# Patient Record
Sex: FEMALE | Race: WHITE | HISPANIC OR LATINO | Employment: FULL TIME | URBAN - METROPOLITAN AREA
[De-identification: names, ages, dates, MRNs, and addresses within clinical notes are randomized per-mention and may not be internally consistent; named-entity substitution may affect disease eponyms.]

---

## 2017-03-01 ENCOUNTER — HOSPITAL ENCOUNTER (EMERGENCY)
Facility: HOSPITAL | Age: 57
Discharge: HOME | End: 2017-03-01
Attending: EMERGENCY MEDICINE
Payer: COMMERCIAL

## 2017-03-01 VITALS
WEIGHT: 140 LBS | RESPIRATION RATE: 18 BRPM | HEIGHT: 65 IN | HEART RATE: 63 BPM | DIASTOLIC BLOOD PRESSURE: 79 MMHG | SYSTOLIC BLOOD PRESSURE: 140 MMHG | BODY MASS INDEX: 23.32 KG/M2 | OXYGEN SATURATION: 99 % | TEMPERATURE: 99 F

## 2017-03-01 DIAGNOSIS — R55 SYNCOPE: ICD-10-CM

## 2017-03-01 LAB
ALBUMIN SERPL BCP-MCNC: 3.9 G/DL
ALP SERPL-CCNC: 103 U/L
ALT SERPL W/O P-5'-P-CCNC: 19 U/L
ANION GAP SERPL CALC-SCNC: 10 MMOL/L
AST SERPL-CCNC: 16 U/L
BASOPHILS # BLD AUTO: 0.01 K/UL
BASOPHILS NFR BLD: 0.1 %
BILIRUB SERPL-MCNC: 0.5 MG/DL
BNP SERPL-MCNC: 63 PG/ML
BUN SERPL-MCNC: 16 MG/DL
CALCIUM SERPL-MCNC: 9.2 MG/DL
CHLORIDE SERPL-SCNC: 110 MMOL/L
CO2 SERPL-SCNC: 25 MMOL/L
CREAT SERPL-MCNC: 0.7 MG/DL
DIFFERENTIAL METHOD: ABNORMAL
EOSINOPHIL # BLD AUTO: 0 K/UL
EOSINOPHIL NFR BLD: 0.1 %
ERYTHROCYTE [DISTWIDTH] IN BLOOD BY AUTOMATED COUNT: 12.5 %
EST. GFR  (AFRICAN AMERICAN): >60 ML/MIN/1.73 M^2
EST. GFR  (NON AFRICAN AMERICAN): >60 ML/MIN/1.73 M^2
GLUCOSE SERPL-MCNC: 90 MG/DL
HCT VFR BLD AUTO: 40.5 %
HGB BLD-MCNC: 13.5 G/DL
INR PPP: 1.1
LYMPHOCYTES # BLD AUTO: 1.2 K/UL
LYMPHOCYTES NFR BLD: 16.7 %
MCH RBC QN AUTO: 31.3 PG
MCHC RBC AUTO-ENTMCNC: 33.3 %
MCV RBC AUTO: 94 FL
MONOCYTES # BLD AUTO: 0.5 K/UL
MONOCYTES NFR BLD: 6.1 %
NEUTROPHILS # BLD AUTO: 5.7 K/UL
NEUTROPHILS NFR BLD: 77 %
PLATELET # BLD AUTO: 231 K/UL
PMV BLD AUTO: 9.6 FL
POTASSIUM SERPL-SCNC: 3.5 MMOL/L
PROT SERPL-MCNC: 7 G/DL
PROTHROMBIN TIME: 11.2 SEC
RBC # BLD AUTO: 4.32 M/UL
SODIUM SERPL-SCNC: 145 MMOL/L
TROPONIN I SERPL DL<=0.01 NG/ML-MCNC: 0.01 NG/ML
WBC # BLD AUTO: 7.43 K/UL

## 2017-03-01 PROCEDURE — 80053 COMPREHEN METABOLIC PANEL: CPT

## 2017-03-01 PROCEDURE — 93005 ELECTROCARDIOGRAM TRACING: CPT

## 2017-03-01 PROCEDURE — 93010 ELECTROCARDIOGRAM REPORT: CPT | Mod: ,,, | Performed by: INTERNAL MEDICINE

## 2017-03-01 PROCEDURE — 85025 COMPLETE CBC W/AUTO DIFF WBC: CPT

## 2017-03-01 PROCEDURE — 85610 PROTHROMBIN TIME: CPT

## 2017-03-01 PROCEDURE — 83880 ASSAY OF NATRIURETIC PEPTIDE: CPT

## 2017-03-01 PROCEDURE — 99285 EMERGENCY DEPT VISIT HI MDM: CPT | Mod: 25

## 2017-03-01 PROCEDURE — 25000003 PHARM REV CODE 250: Performed by: EMERGENCY MEDICINE

## 2017-03-01 PROCEDURE — 84484 ASSAY OF TROPONIN QUANT: CPT

## 2017-03-01 RX ORDER — ENALAPRIL MALEATE 10 MG/1
10 TABLET ORAL DAILY
COMMUNITY
End: 2017-03-02 | Stop reason: SDUPTHER

## 2017-03-01 RX ORDER — ASPIRIN 325 MG
325 TABLET ORAL
Status: COMPLETED | OUTPATIENT
Start: 2017-03-01 | End: 2017-03-01

## 2017-03-01 RX ADMIN — ASPIRIN 325 MG ORAL TABLET 325 MG: 325 PILL ORAL at 01:03

## 2017-03-01 NOTE — ED PROVIDER NOTES
SCRIBE #1 NOTE: I, Christine Warren, am scribing for, and in the presence of, Nino Bullock Jr., MD. I have scribed the entire note.      History      Chief Complaint   Patient presents with    Loss of Consciousness     while at work. C/o left arm pain PTA but denies CP or SOB. Pt appears dazed but oriented x 3.        Review of patient's allergies indicates:   Allergen Reactions    Penicillins         HPI   HPI    3/1/2017, 12:50 PM   History obtained from the patient      History of Present Illness: Marta Flowers is a 56 y.o. female patient who presents to the Emergency Department for syncope which onset suddenly PTA while pt was at work. Episode lasted approximately 1 minute. Sx have been episodic and moderate in severity. No modifying factors noted. Pt reports experiencing a discomfort to the periumbilical region prior to losing consciousness. Pt is currently complaining of left posterior shoulder pain. Pt denies any fever, chills, CP, SOB, n/v/d, HA, dizziness, speech difficulty, or unilateral weakness/numbness. No further complaints at this time.       Arrival mode: Ambulance Service.     PCP: Primary Doctor No     Past Medical History:  Past medical history reviewed not relevant      Past Surgical History:  Past surgical history reviewed not relevant      Family History:  Family history reviewed not relevant      Social History:  Social History Main Topics    Smoking status: Unknown if ever smoked    Smokeless tobacco: Unknown if ever used    Alcohol Use: Unknown drinking history    Drug Use: Unknown if ever used    Sexual Activity: Unknown     ROS   Review of Systems   Constitutional: Negative for chills, diaphoresis and fever.   HENT: Negative for sore throat.    Eyes: Negative for visual disturbance.   Respiratory: Negative for shortness of breath.    Cardiovascular: Negative for chest pain.   Gastrointestinal: Positive for abdominal pain. Negative for constipation, diarrhea, nausea and vomiting.  "  Genitourinary: Negative for dysuria.   Musculoskeletal: Positive for arthralgias (left shoulder). Negative for joint swelling, neck pain and neck stiffness.   Skin: Negative for rash.   Neurological: Positive for syncope. Negative for dizziness, speech difficulty, weakness, light-headedness, numbness and headaches.   Hematological: Does not bruise/bleed easily.       Physical Exam    Initial Vitals   BP Pulse Resp Temp SpO2   03/01/17 1243 03/01/17 1243 03/01/17 1243 03/01/17 1243 03/01/17 1243   125/84 76 18 98.5 °F (36.9 °C) 98 %      Physical Exam  Nursing Notes and Vital Signs Reviewed.  Constitutional: Patient is in no acute distress. Awake and alert. Well-developed and well-nourished.  Head: Atraumatic. Normocephalic.  Eyes: PERRL. EOM intact. Conjunctivae are not pale. No scleral icterus.  ENT: Mucous membranes are moist. Oropharynx is clear and symmetric.    Neck: Supple. Full ROM. No lymphadenopathy.  Cardiovascular: Regular rate. Regular rhythm. No murmurs, rubs, or gallops. Distal pulses are 2+ and symmetri.  Pulmonary/Chest: No respiratory distress. Clear to auscultation bilaterally. No wheezing, rales, or rhonchi.  Abdominal: Soft and non-distended.  There is no tenderness.  No rebound, guarding, or rigidity.   Musculoskeletal: Moves all extremities. No obvious deformities. No edema. No calf tenderness.  Skin: Warm and dry.  Neurological:  Alert, awake, and appropriate.  Normal speech.  No acute focal neurological deficits are appreciated.  Psychiatric: Normal affect. Good eye contact. Appropriate in content.    ED Course    Procedures  ED Vital Signs:  Vitals:    03/01/17 1243 03/01/17 1254 03/01/17 1309 03/01/17 1311   BP: 125/84  125/77 128/73   Pulse: 76 72 69 70   Resp: 18  18 18   Temp: 98.5 °F (36.9 °C)      TempSrc: Oral      SpO2: 98%  100%    Weight: 63.5 kg (140 lb)      Height: 5' 5" (1.651 m)       03/01/17 1313 03/01/17 1402   BP: 130/83 (!) 140/79   Pulse: 77 63   Resp: 20 18   Temp:   "   TempSrc:     SpO2: 99% 99%   Weight:     Height:         Abnormal Lab Results:  Labs Reviewed   CBC W/ AUTO DIFFERENTIAL - Abnormal; Notable for the following:        Result Value    MCH 31.3 (*)     Gran% 77.0 (*)     Lymph% 16.7 (*)     All other components within normal limits    Narrative:     PLEASE REVIEW ORDER START TIME BEFORE MARKING SPECIMEN  COLLECTED.   COMPREHENSIVE METABOLIC PANEL    Narrative:     PLEASE REVIEW ORDER START TIME BEFORE MARKING SPECIMEN  COLLECTED.   PROTIME-INR    Narrative:     PLEASE REVIEW ORDER START TIME BEFORE MARKING SPECIMEN  COLLECTED.   TROPONIN I    Narrative:     PLEASE REVIEW ORDER START TIME BEFORE MARKING SPECIMEN  COLLECTED.   B-TYPE NATRIURETIC PEPTIDE    Narrative:     PLEASE REVIEW ORDER START TIME BEFORE MARKING SPECIMEN  COLLECTED.        All Lab Results:  Results for orders placed or performed during the hospital encounter of 03/01/17   CBC auto differential   Result Value Ref Range    WBC 7.43 3.90 - 12.70 K/uL    RBC 4.32 4.00 - 5.40 M/uL    Hemoglobin 13.5 12.0 - 16.0 g/dL    Hematocrit 40.5 37.0 - 48.5 %    MCV 94 82 - 98 fL    MCH 31.3 (H) 27.0 - 31.0 pg    MCHC 33.3 32.0 - 36.0 %    RDW 12.5 11.5 - 14.5 %    Platelets 231 150 - 350 K/uL    MPV 9.6 9.2 - 12.9 fL    Gran # 5.7 1.8 - 7.7 K/uL    Lymph # 1.2 1.0 - 4.8 K/uL    Mono # 0.5 0.3 - 1.0 K/uL    Eos # 0.0 0.0 - 0.5 K/uL    Baso # 0.01 0.00 - 0.20 K/uL    Gran% 77.0 (H) 38.0 - 73.0 %    Lymph% 16.7 (L) 18.0 - 48.0 %    Mono% 6.1 4.0 - 15.0 %    Eosinophil% 0.1 0.0 - 8.0 %    Basophil% 0.1 0.0 - 1.9 %    Differential Method Automated    Comprehensive metabolic panel   Result Value Ref Range    Sodium 145 136 - 145 mmol/L    Potassium 3.5 3.5 - 5.1 mmol/L    Chloride 110 95 - 110 mmol/L    CO2 25 23 - 29 mmol/L    Glucose 90 70 - 110 mg/dL    BUN, Bld 16 6 - 20 mg/dL    Creatinine 0.7 0.5 - 1.4 mg/dL    Calcium 9.2 8.7 - 10.5 mg/dL    Total Protein 7.0 6.0 - 8.4 g/dL    Albumin 3.9 3.5 - 5.2 g/dL     Total Bilirubin 0.5 0.1 - 1.0 mg/dL    Alkaline Phosphatase 103 55 - 135 U/L    AST 16 10 - 40 U/L    ALT 19 10 - 44 U/L    Anion Gap 10 8 - 16 mmol/L    eGFR if African American >60 >60 mL/min/1.73 m^2    eGFR if non African American >60 >60 mL/min/1.73 m^2   Protime-INR   Result Value Ref Range    Prothrombin Time 11.2 9.0 - 12.5 sec    INR 1.1 0.8 - 1.2   Troponin I   Result Value Ref Range    Troponin I 0.014 0.000 - 0.026 ng/mL   B-Type natriuretic peptide (BNP)   Result Value Ref Range    BNP 63 0 - 99 pg/mL         Imaging Results:  Imaging Results         CT Head Without Contrast (Final result) Result time:  03/01/17 14:03:39    Final result by Claudia Barrios III, MD (03/01/17 14:03:39)    Impression:       No acute intracranial disease identified.       Electronically signed by: CLAUDIA BARRIOS MD  Date:     03/01/17  Time:    14:03     Narrative:    Head CT without contrast    Clinical history: R55 Syncope and collapse    TECHNIQUE: Routine noncontrast axial head CT. All CT scans at this facility use dose modulation, iterative reconstruction, and/or weight based dosing when appropriate to reduce radiation dose to as low as reasonably achievable.    Comparison: none    Findings: There is no evidence of intracranial hemorrhage, mass-effect, hydrocephalus or other acute disease. There is no CT evidence of acute ischemia or cerebral edema. The visualized paranasal sinuses and mastoid air cells are clear. No calvarial fracture is identified.            X-Ray Chest PA And Lateral (Final result) Result time:  03/01/17 13:33:10    Final result by Claudia Barrios III, MD (03/01/17 13:33:10)    Impression:     No acute disease identified in the chest.      Electronically signed by: CLAUDIA BARRIOS MD  Date:     03/01/17  Time:    13:33     Narrative:    XR CHEST PA AND LATERAL    Clinical history: Chest Pain    Findings: The lungs appear clear of active disease. There is no evidence of pneumonia, mass, effusion,  pneumothorax or other acute pulmonary disease.  Cardiomediastinal silhouette is within normal limits.  No acute osseous abnormality detected.             The EKG was ordered, reviewed, and independently interpreted by the ED provider.  Interpretation time: 12:48  Rate: 62 BPM  Rhythm: normal sinus rhythm  Interpretation: LAD. No STEMI.         The Emergency Provider reviewed the vital signs and test results, which are outlined above.    ED Discussion     2:25 PM: Reassessed pt at this time.   Discussed with pt all pertinent ED information and results. Discussed pt dx  and plan of tx. Gave pt all f/u and return to the ED instructions. All questions and concerns were addressed at this time. Pt expresses understanding of information and instructions, and is comfortable with plan to discharge. Pt is stable for discharge.    ED Medication(s):  Medications   aspirin tablet 325 mg (325 mg Oral Given 3/1/17 0473)       Follow-up Information     Follow up with PCP. Call in 2 days.            Medical Decision Making    Medical Decision Making:   Clinical Tests:   Lab Tests: Ordered and Reviewed  Radiological Study: Ordered and Reviewed  Medical Tests: Ordered and Reviewed           Scribe Attestation:   Scribe #1: I performed the above scribed service and the documentation accurately describes the services I performed. I attest to the accuracy of the note.    Attending:   Physician Attestation Statement for Scribe #1: I, Nino Bullock Jr., MD, personally performed the services described in this documentation, as scribed by Christine Warren, in my presence, and it is both accurate and complete.          Clinical Impression       ICD-10-CM ICD-9-CM   1. Syncope R55 780.2       Disposition:   Disposition: Discharged  Condition: Stable         Nino Bullock Jr., MD  03/01/17 5181

## 2017-03-01 NOTE — DISCHARGE INSTRUCTIONS
Causes of Syncope  Syncope (fainting) has many causes. Sometimes it is not serious. In other cases, syncope is a sign of a heart problem. But treatment can help    When syncope is not serious  Your healthcare provider may call your problem vasovagal syncope, reflex syncope, or orthostatic hypotension. These types of syncope are generally not serious. They can be caused by:  · Strong feelings, such as anxiety or fear. A nerve signal may briefly change your heart rate and lower your blood pressure too much.  · Standing for too long. Standing may cause blood to pool in your legs. When this happens, your brain may not receive all the blood it needs.  · Standing up too quickly. Your blood pressure may not adjust fast enough to changes in posture and may drop too low. Certain medicines can also cause this problem. Examples of medicines that can cause a drop in blood pressure include diuretics, blood pressure medicines, and medicines for chest pain. Your pharmacist or healthcare provider can discuss these with you.  · Reaction to normal body functions. When you go to the bathroom, have gastrointestinal discomfort, nausea, or pain, your heart may have a natural reflex to slow down and lower blood pressure. This can result in syncope. This may also follow exercise, eating, laughter, weight lifting, or playing musical instruments like the trumpet or trombone.  When heart trouble causes syncope  A heart problem can decrease the amount of oxygen-rich blood that reaches the brain. Heart trouble can be serious and even life threatening if not treated:  · A slow heart rate. Electrical signals tell the chambers of the heart when to pump. But the signals may be slowed or blocked (heart block) as they travel on the hearts electrical pathways. This can be caused by aging, scarred heart tissue, or damage from heart disease. When the heart rate slows, not enough blood is pumped.  · A fast heart rate. Certain problems can make the  heart race. For instance, after a heart attack, also known as acute myocardial infarction, or AMI, abnormal electrical signals may be created. These signals can make the heart suddenly beat very fast. The heart pumps before the chambers can fill with blood. So less blood reaches the brain and other parts of the body. Illegal drugs, certain medicines, heart disease, or an inherited condition can also cause this.  · A heart valve problem. Blood travels through the chambers of the heart as it is pumped. Heart valves open and close to help move blood in the right direction. But a valve may not open or close fully, if its hardened or scarred. As a result, less blood is pumped through the heart to the brain and body. Most often, syncope occurs when a person's aortic valve is critically narrowed and he or she participates in  a strenuous activity.  · A heart muscle problem. Some people develop a thickened heart muscle that blocks blood flow out of the heart to the body. This is called hypertrophic cardiomyopathy. Being dehydrated and having hypertrophic cardiomyopathy can increase the risk for syncope.  Whatever the cause of syncope, it is important to be evaluated by your healthcare provider. You may need to be seen by a cardiologist, neurologist, or an ear, nose, and throat specialist. Do not drive, operate heavy machinery, or participate in activities in which you would be at risk for falls and injury if you have syncope and have not been evaluated.  Date Last Reviewed: 5/1/2016  © 5118-3280 [employer]. [address] [place] [address], [place] [address]. All rights reserved. This information is not intended as a substitute for professional medical care. Always follow your healthcare professional's instructions.

## 2017-03-02 ENCOUNTER — OFFICE VISIT (OUTPATIENT)
Dept: INTERNAL MEDICINE | Facility: CLINIC | Age: 57
End: 2017-03-02
Payer: COMMERCIAL

## 2017-03-02 ENCOUNTER — TELEPHONE (OUTPATIENT)
Dept: INTERNAL MEDICINE | Facility: CLINIC | Age: 57
End: 2017-03-02

## 2017-03-02 ENCOUNTER — PATIENT OUTREACH (OUTPATIENT)
Dept: ADMINISTRATIVE | Facility: HOSPITAL | Age: 57
End: 2017-03-02
Payer: COMMERCIAL

## 2017-03-02 VITALS
TEMPERATURE: 98 F | OXYGEN SATURATION: 98 % | SYSTOLIC BLOOD PRESSURE: 138 MMHG | HEIGHT: 65 IN | DIASTOLIC BLOOD PRESSURE: 85 MMHG | BODY MASS INDEX: 21.79 KG/M2 | WEIGHT: 130.81 LBS | HEART RATE: 63 BPM

## 2017-03-02 DIAGNOSIS — Z80.0 FH: GASTRIC CANCER: ICD-10-CM

## 2017-03-02 DIAGNOSIS — R55 SYNCOPE, UNSPECIFIED SYNCOPE TYPE: Primary | ICD-10-CM

## 2017-03-02 DIAGNOSIS — K59.09 CONSTIPATION, CHRONIC: ICD-10-CM

## 2017-03-02 DIAGNOSIS — I10 ESSENTIAL HYPERTENSION: ICD-10-CM

## 2017-03-02 PROCEDURE — 99999 PR PBB SHADOW E&M-EST. PATIENT-LVL III: CPT | Mod: PBBFAC,,, | Performed by: FAMILY MEDICINE

## 2017-03-02 PROCEDURE — 1160F RVW MEDS BY RX/DR IN RCRD: CPT | Mod: S$GLB,,, | Performed by: FAMILY MEDICINE

## 2017-03-02 PROCEDURE — 3075F SYST BP GE 130 - 139MM HG: CPT | Mod: S$GLB,,, | Performed by: FAMILY MEDICINE

## 2017-03-02 PROCEDURE — 3079F DIAST BP 80-89 MM HG: CPT | Mod: S$GLB,,, | Performed by: FAMILY MEDICINE

## 2017-03-02 PROCEDURE — 99203 OFFICE O/P NEW LOW 30 MIN: CPT | Mod: S$GLB,,, | Performed by: FAMILY MEDICINE

## 2017-03-02 RX ORDER — ENALAPRIL MALEATE 10 MG/1
10 TABLET ORAL DAILY
Qty: 90 TABLET | Refills: 3 | Status: SHIPPED | OUTPATIENT
Start: 2017-03-02 | End: 2018-03-10 | Stop reason: SDUPTHER

## 2017-03-02 NOTE — PROGRESS NOTES
Subjective:       Patient ID: Marta Flowers is a 56 y.o. female.    Chief Complaint: Establish Care; blood pressure medication; and hospital follow up for blood pressure    HPI Comments: Emergency room follow-up for syncope.  She reports that for the past 14 days she working 12  hours a day.  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  She had an episode at work where she stood up felt lightheaded and she reports blacked out.  She denies  chest pain palpitations or shortness of breath.  Denies any leg swelling or pain.  She reports some epigastric discomfort at the time.  She reports this is the fourth episode over the last 5 years.  She had 1 episode 5 years ago and another  episode 1 year ago.  Family history includes a sister with gastric cancer recently .  Emergency room evaluation was essentially negative with a CBC CMP and EKG CT of head been done.  She also has a concern of chronic constipation over several years with occasional left abdominal pain.  She denies any nausea vomiting or rectal bleeding or melena.  She reports about 12 pounds weight loss in the past 2 weeks that she attributes to increased workload.  She reports her appetite is normal and she is eating well.  Medication includes lisinopril 10 mg a day.      Review of Systems   Constitutional: Positive for unexpected weight change. Negative for appetite change, fatigue and fever.   HENT: Negative for congestion.    Respiratory: Negative for shortness of breath and wheezing.    Cardiovascular: Negative for chest pain, palpitations and leg swelling.   Gastrointestinal: Positive for abdominal pain and constipation. Negative for diarrhea, nausea and vomiting.   Genitourinary: Negative for difficulty urinating and frequency.   Neurological: Positive for syncope. Negative for seizures, numbness and headaches.       Objective:      Physical Exam   Constitutional: She is oriented to person, place, and time. She appears  well-developed and well-nourished.   Eyes: Conjunctivae are normal.   Neck: Neck supple. No thyromegaly present.   Cardiovascular: Normal rate, regular rhythm and normal heart sounds.    No murmur heard.  2+ carotid pulse   Pulmonary/Chest: Effort normal and breath sounds normal. No respiratory distress. She has no wheezes. She has no rales. She exhibits tenderness.   Tenderness without deformity.localized to a lower ant rib left side   Abdominal: Soft. Bowel sounds are normal. She exhibits no mass. There is no tenderness.   Lymphadenopathy:     She has no cervical adenopathy.   Neurological: She is alert and oriented to person, place, and time. No cranial nerve deficit. She exhibits normal muscle tone. Coordination normal.   Skin: No rash noted.   Psychiatric: She has a normal mood and affect. Her behavior is normal. Thought content normal.       Assessment:       1. Syncope, unspecified syncope type        Plan:     blood pressure noticed with no postural change.  Echocardiogram and holter  monitor ordered.  Follow-up in 2 weeks.  She's having some dental pain and to see a dentist  today.  She eventually needs GI referral for chronic constipation and abdominal pain.  Lipid profile TSH to be done next visit   Lisinopril refilled

## 2017-03-02 NOTE — MR AVS SNAPSHOT
[facility]  [address]  [place] [address]  Phone: [phone]  Fax: [phone]                  Marta Flowers   3/2/2017 10:30 AM   Office Visit    Description:  Female : 1960   Provider:  Virgilio James MD   Department:  [facility]           Reason for Visit     Establish Care     blood pressure medication     hospital follow up for blood pressure           Diagnoses this Visit        Comments    Syncope, unspecified syncope type    -  Primary     Essential hypertension         Constipation, chronic         FH: gastric cancer                To Do List           Future Appointments        Provider Department Dept Phone    3/16/2017 7:00 AM Virgilio James MD [facility] [phone]      Goals (5 Years of Data)     None      Follow-Up and Disposition     Return in about 2 weeks (around 3/16/2017).    Follow-up and Disposition History       These Medications        Disp Refills Start End    enalapril (VASOTEC) 10 MG tablet 90 tablet 3 3/2/2017     Take 1 tablet (10 mg total) by mouth once daily. - Oral    Pharmacy: [facility] [address] [place] [address] #: [phone]         [facility] On Call     [facility] On Call Nurse Care Line -  Assistance  Registered nurses in the [facility] On Call Center provide clinical advisement, health education, appointment booking, and other advisory services.  Call for this free service at [phone].             Medications           Message regarding Medications     Verify the changes and/or additions to your medication regime listed below are the same as discussed with your clinician today.  If any of these changes or additions are incorrect, please notify your healthcare provider.        CHANGE how you are taking these medications     Start Taking Instead of    enalapril (VASOTEC) 10 MG tablet enalapril (VASOTEC) 10 MG tablet    Dosage:  Take 1 tablet (10 mg total) by mouth once  daily. Dosage:  Take 10 mg by mouth once daily.    Reason for Change:  Reorder            Verify that the below list of medications is an accurate representation of the medications you are currently taking.  If none reported, the list may be blank. If incorrect, please contact your healthcare provider. Carry this list with you in case of emergency.           Current Medications     enalapril (VASOTEC) 10 MG tablet Take 1 tablet (10 mg total) by mouth once daily.           Clinical Reference Information           Your Vitals Were     BP                   138/85 (BP Location: Right arm, Patient Position: Sitting, BP Method: Automatic)           Blood Pressure          Most Recent Value    BP  138/85      Allergies as of 3/2/2017     Penicillins      Immunizations Administered on Date of Encounter - 3/2/2017     None      Orders Placed During Today's Visit     Future Labs/Procedures Expected by Expires    2D Echo Only  As directed 3/2/2018    Holter monitor - 24 hour  As directed 3/2/2018      [facility] Sign-Up     Activating your [facility] account is as easy as 1-2-3!     1) Visit [email or web address], select Sign Up Now, enter this activation code and your date of birth, then select Next.  [email or web address]  Expires: 4/15/2017  2:26 PM      2) Create a username and password to use when you visit [facility] in the future and select a security question in case you lose your password and select Next.    3) Enter your e-mail address and click Sign Up!    Additional Information  If you have questions, please e-mail [email or web address] or call [phone] to talk to our [facility] staff. Remember, [facility] is NOT to be used for urgent needs. For medical emergencies, dial 911.         Language Assistance Services     ATTENTION: Language assistance services are available, free of charge. Please call [phone].      ATENCIÓN: Si habla español, tiene a partida disposición servicios gratuitos de asistencia lingüística. Llame al  [phone].     ADIN Ý: N?u b?n nói Ti?ng Vi?t, có các d?ch v? h? tr? ngôn ng? mi?n phí dành cho b?n. G?i s? [phone].         [facility] complies with applicable Federal civil rights laws and does not discriminate on the basis of race, color, national origin, age, disability, or sex.

## 2017-03-02 NOTE — TELEPHONE ENCOUNTER
Pt states that she will schedule her Echo and Holter Appt when she comes back for her 2wk visit on 3/16/17

## 2017-03-06 ENCOUNTER — TELEPHONE (OUTPATIENT)
Dept: CARDIOLOGY | Facility: CLINIC | Age: 57
End: 2017-03-06

## 2017-03-06 NOTE — TELEPHONE ENCOUNTER
Attempted to reach patient in order to schedule the Echo and holter that has been ordered for her.  No option to leave a voice mail.

## 2017-03-07 ENCOUNTER — NURSE TRIAGE (OUTPATIENT)
Dept: ADMINISTRATIVE | Facility: CLINIC | Age: 57
End: 2017-03-07

## 2017-03-07 ENCOUNTER — TELEPHONE (OUTPATIENT)
Dept: INTERNAL MEDICINE | Facility: CLINIC | Age: 57
End: 2017-03-07

## 2017-03-07 NOTE — TELEPHONE ENCOUNTER
Pt was calling about a medical release form- advised to follow up with PCP    Reason for Disposition   Information only question and nurse able to answer    Protocols used: ST NO PROTOCOL CALL: INFORMATION ONLY-A-[place]

## 2017-03-07 NOTE — TELEPHONE ENCOUNTER
----- Message from Jojo Hudson sent at 3/7/2017  8:51 AM CST -----  Contact: Patient   Patient request a call back at [phone], Regards to a release back to work.    Thanks  Td

## 2017-03-08 ENCOUNTER — TELEPHONE (OUTPATIENT)
Dept: INTERNAL MEDICINE | Facility: CLINIC | Age: 57
End: 2017-03-08

## 2017-03-08 NOTE — TELEPHONE ENCOUNTER
----- Message from Rhina Sexton sent at 3/7/2017  4:49 PM CST -----  Contact: Cathryn [phone]  Please fax doctor's notes on 03/01/17 to [phone] regarding patient. Please call back @ [phone]. Thank you

## 2017-03-08 NOTE — TELEPHONE ENCOUNTER
Spoke with Cathryn, informed her that we are not allowed to give them her medical records, she can come sign a release and she can give them to her employer. Verbalized understanding.

## 2017-03-15 ENCOUNTER — TELEPHONE (OUTPATIENT)
Dept: CARDIOLOGY | Facility: CLINIC | Age: 57
End: 2017-03-15

## 2017-03-15 NOTE — TELEPHONE ENCOUNTER
Attempted to contact pt regarding the echo and holter that was ordered for her.  Unsuccessful in my attempts.  I will mail letter.

## 2017-03-22 ENCOUNTER — DOCUMENTATION ONLY (OUTPATIENT)
Dept: CARDIOLOGY | Facility: CLINIC | Age: 57
End: 2017-03-22

## 2017-03-22 ENCOUNTER — TELEPHONE (OUTPATIENT)
Dept: INTERNAL MEDICINE | Facility: CLINIC | Age: 57
End: 2017-03-22

## 2017-03-22 NOTE — TELEPHONE ENCOUNTER
----- Message from Yesenia Parr sent at 3/22/2017  2:35 PM CDT -----  We have attempted to contact this patient on many occasions to schedule the echo and holter that you ordered.  We have left messages and mailed letters without hearing back from her.  Your office may have better luck getting in touch with her.

## 2017-03-22 NOTE — PROGRESS NOTES
Sent message to provider regarding our many failed attempts to contact patient for scheduling of her cardiac test.  Will defer for another week.  If she is not scheduled by then we will remove the orders from the workque.

## 2018-03-12 RX ORDER — ENALAPRIL MALEATE 10 MG/1
TABLET ORAL
Qty: 30 TABLET | Refills: 0 | Status: SHIPPED | OUTPATIENT
Start: 2018-03-12 | End: 2018-05-04 | Stop reason: SDUPTHER

## 2018-05-07 RX ORDER — ENALAPRIL MALEATE 10 MG/1
TABLET ORAL
Qty: 30 TABLET | Refills: 0 | Status: SHIPPED | OUTPATIENT
Start: 2018-05-07

## 2019-04-30 NOTE — LETTER
March 2, 2017    Marta Flowers  [address] [place] [address]             [facility]  [address]  [place] [address]  Phone: [phone] Dear Mrs. Flowers:    [facility] is committed to your overall health.  To help you get the most out of each of your visits, we will review your information to make sure you are up to date on all of your recommended tests and/or procedures.      DR. EDNA TATE has found that you may be due for   Health Maintenance Due   Topic    Hepatitis C Screening     Lipid Panel     TETANUS VACCINE     Pap Smear     Mammogram     Colonoscopy     Influenza Vaccine       If you have had any of the above done at another facility, please bring the records or information with you so that your record at [facility] will be complete.    If you are currently taking medication, please bring it with you to your appointment for review.    We will be happy to assist you with scheduling any necessary appointments or you may contact the [facility] appointment desk at [phone] to schedule at your convenience.     Thank you for choosing [facility] for your healthcare needs.  If you have any questions or concerns, please don't hesitate to call.    Sincerely,  Priscila ARREOLA LPN Care Coordinator  [facility]           
Attestation Statements

## 2020-07-25 ENCOUNTER — TELEPHONE ENCOUNTER (OUTPATIENT)
Dept: URBAN - METROPOLITAN AREA CLINIC 13 | Facility: CLINIC | Age: 60
End: 2020-07-25

## 2020-07-25 RX ORDER — POLYETHYLENE GLYCOL 3350, SODIUM SULFATE, SODIUM CHLORIDE, POTASSIUM CHLORIDE, ASCORBIC ACID, SODIUM ASCORBATE 7.5-2.691G
DRINK HALF OF THE SOLUTION AT 5 PM THE DAY BEFORE PROCEDURE AND THEN LAST HALF 6 HOURS PRIOR TO PROCEDURE KIT ORAL
Qty: 1 | Refills: 0 | OUTPATIENT
Start: 2017-01-13 | End: 2017-03-29

## 2020-07-25 RX ORDER — NAPROXEN SODIUM 220 MG
TAKE 2 TABLET AS NEEDED TABLET ORAL
Refills: 0 | OUTPATIENT
Start: 2007-11-27 | End: 2012-05-30

## 2020-07-25 RX ORDER — HYOSCYAMINE SULFATE 0.12 MG/1
TAKE 1 TABLET SL Q6 HRS PRN ABDOMINAL PAIN TABLET, ORALLY DISINTEGRATING ORAL
Qty: 60 | Refills: 2 | OUTPATIENT
Start: 2012-05-30 | End: 2017-01-13

## 2020-07-25 RX ORDER — ACETAMINOPHEN 500 MG/1
TAKE 2 TABLET AS NEEDED TABLET, FILM COATED ORAL
Refills: 0 | OUTPATIENT
Start: 2008-05-09 | End: 2012-05-30

## 2020-07-26 ENCOUNTER — TELEPHONE ENCOUNTER (OUTPATIENT)
Dept: URBAN - METROPOLITAN AREA CLINIC 13 | Facility: CLINIC | Age: 60
End: 2020-07-26

## 2020-07-26 RX ORDER — NAPROXEN SODIUM 220 MG
TAKE 1 TABLET TWICE DAILY AS NEEDED TABLET ORAL
Refills: 0 | Status: ACTIVE | COMMUNITY

## 2020-07-26 RX ORDER — ESTRADIOL 10 UG/1
TABLET VAGINAL
Refills: 0 | Status: ACTIVE | COMMUNITY

## 2020-07-26 RX ORDER — WHEAT DEXTRIN 3 G/4 G
TAKE 1 TBSP DAILY POWDER (GRAM) ORAL
Qty: 1 | Refills: 11 | Status: ACTIVE | COMMUNITY

## 2020-07-26 RX ORDER — ESOMEPRAZOLE MAGNESIUM 40 MG
TAKE ONE CAPSULE BY MOUTH TWICE A DAY 30 MIN BEFORE BREAKFAST AND DINNER CAPSULE,DELAYED RELEASE (ENTERIC COATED) ORAL
Qty: 60 | Refills: 11 | Status: ACTIVE | COMMUNITY
Start: 2017-03-29

## 2020-07-26 RX ORDER — HYOSCYAMINE SULFATE 0.12 MG/1
DISSOLVE 1 TABLET UNDER THE TONGUE EVERY 4 TO 6 HOURS AS NEEDED FOR ABDOMINAL PAIN TABLET, ORALLY DISINTEGRATING ORAL
Qty: 30 | Refills: 3 | Status: ACTIVE | COMMUNITY
Start: 2017-03-29

## 2022-02-04 ENCOUNTER — OFFICE VISIT (OUTPATIENT)
Dept: URBAN - METROPOLITAN AREA CLINIC 113 | Facility: CLINIC | Age: 62
End: 2022-02-04
Payer: COMMERCIAL

## 2022-02-04 ENCOUNTER — WEB ENCOUNTER (OUTPATIENT)
Dept: URBAN - METROPOLITAN AREA CLINIC 113 | Facility: CLINIC | Age: 62
End: 2022-02-04

## 2022-02-04 ENCOUNTER — LAB OUTSIDE AN ENCOUNTER (OUTPATIENT)
Dept: URBAN - METROPOLITAN AREA CLINIC 113 | Facility: CLINIC | Age: 62
End: 2022-02-04

## 2022-02-04 VITALS
HEART RATE: 85 BPM | HEIGHT: 68 IN | WEIGHT: 150 LBS | RESPIRATION RATE: 18 BRPM | BODY MASS INDEX: 22.73 KG/M2 | DIASTOLIC BLOOD PRESSURE: 80 MMHG | TEMPERATURE: 98 F | SYSTOLIC BLOOD PRESSURE: 117 MMHG

## 2022-02-04 DIAGNOSIS — K21.9 GERD WITHOUT ESOPHAGITIS: ICD-10-CM

## 2022-02-04 DIAGNOSIS — Z12.11 SCREEN FOR COLON CANCER: ICD-10-CM

## 2022-02-04 PROCEDURE — 99203 OFFICE O/P NEW LOW 30 MIN: CPT | Performed by: INTERNAL MEDICINE

## 2022-02-04 RX ORDER — ESOMEPRAZOLE MAGNESIUM 40 MG
TAKE ONE CAPSULE BY MOUTH TWICE A DAY 30 MIN BEFORE BREAKFAST AND DINNER CAPSULE,DELAYED RELEASE (ENTERIC COATED) ORAL
Qty: 60 | Refills: 11 | Status: DISCONTINUED | COMMUNITY
Start: 2017-03-29

## 2022-02-04 RX ORDER — CYCLOSPORINE 0.5 MG/ML
1 DROP INTO AFFECTED EYE EMULSION OPHTHALMIC TWICE A DAY
Status: ACTIVE | COMMUNITY

## 2022-02-04 RX ORDER — NAPROXEN SODIUM 220 MG
TAKE 1 TABLET TWICE DAILY AS NEEDED TABLET ORAL
Refills: 0 | Status: ACTIVE | COMMUNITY

## 2022-02-04 RX ORDER — WHEAT DEXTRIN 3 G/4 G
TAKE 1 TBSP DAILY POWDER (GRAM) ORAL
Qty: 1 | Refills: 11 | Status: DISCONTINUED | COMMUNITY

## 2022-02-04 RX ORDER — FAMOTIDINE 20 MG/1
1 TABLET AT BEDTIME AS NEEDED TABLET, FILM COATED ORAL ONCE A DAY
Status: ACTIVE | COMMUNITY

## 2022-02-04 RX ORDER — BUPROPION HYDROCHLORIDE 75 MG/1
1 TABLET TABLET ORAL TWICE A DAY
Status: ACTIVE | COMMUNITY

## 2022-02-04 RX ORDER — ALENDRONATE SODIUM 70 MG/1
1 TABLET 30 MINUTES BEFORE THE FIRST FOOD, BEVERAGE OR MEDICINE OF THE DAY WITH PLAIN WATER TABLET ORAL WEEKLY
Status: ACTIVE | COMMUNITY

## 2022-02-04 RX ORDER — ESTRADIOL 10 UG/1
1 TABLET TABLET VAGINAL
Refills: 0 | Status: DISCONTINUED | COMMUNITY

## 2022-02-04 RX ORDER — HYOSCYAMINE SULFATE 0.12 MG/1
DISSOLVE 1 TABLET UNDER THE TONGUE EVERY 4 TO 6 HOURS AS NEEDED FOR ABDOMINAL PAIN TABLET, ORALLY DISINTEGRATING ORAL
Qty: 30 | Refills: 3 | Status: ACTIVE | COMMUNITY
Start: 2017-03-29

## 2022-02-04 RX ORDER — CALCIUM CARBONATE/VITAMIN D3 600 MG-10
1 TABLET WITH A MEAL TABLET ORAL ONCE A DAY
Status: ACTIVE | COMMUNITY

## 2022-02-04 RX ORDER — POLYETHYLENE GLYCOL 3350, SODIUM SULFATE, SODIUM CHLORIDE, POTASSIUM CHLORIDE, ASCORBIC ACID, SODIUM ASCORBATE 140-9-5.2G
AS DIRECTED KIT ORAL ONCE
Qty: 30 | Refills: 1 | OUTPATIENT
Start: 2022-02-04 | End: 2022-04-05

## 2022-02-04 NOTE — HPI-TODAY'S VISIT:
Ms. Doe is a 61-year-old female with a history of GERD, abdominal pain, and constipation presenting for follow-up after a diagnostic testing and therapy for pelvic floor dysfunction. She was last seen here on 1/17/18.  She was seen 10/5/17 to evaluate for possible pelvic floor dysfunction. Symptoms were typical for pelvic floor dyssynergia evidenced by incomplete evacuation with the inability to expel a soft stool. She was scheduled for an MR defecography and anorectal manometry. IBgard was recommended and she was instructed to continue daily fiber.  MR defecography 10/18/17: Dyssynergia defecation with patient unable to expel any of the instilled ultrasound gel from the rectum. No rectocele, cystocele, enterocele, intussusception, mass, polyps, or other structural abnormality detected.  Anorectal manometry 10/18/17: Pelvic floor dyssynergia type III with adequate pushing effort and inadequate relaxation. This has been confirmed with MR defecography showing severe dyssynergia. Patient will likely benefit from pelvic floor retraining.  She was referred to Core Therapy. She reports that this was very successful in remediating her pelvic floor difficulties.  At the time of her last visit in 2018, she was drinking adequate amounts of water, taking daily fiber, and MiraLAX. She has attended multiple physical therapy sessions and is utilizing recommended exercises and breathing. She is having daily bowel movements with complete evacuation. She had modified her diet and gas has significantly improved. IBgard was not helpful and she discontinued it. She reported improvement of abdominal pain, back pain, and heartburn.   She returns todat with c/o worsening reflux symptoms.  She is actually doing "okay."  She has been off Nexium and has been taking Pepcid 40 mg daily typically in the evening about 10 or 11 PM.  Her reflux tend to be nocturnal.  She often sleeps sitting up.  She eats earlier, with smaller volume meals, and avoids coffee.  She has noted a gurgling sensation when she swallows at times.  She has had some intermittent heartburn while taking Fosamax which she attributes to that medication.  However, she is better overall.  She denies odynophagia or dysphagia.  The patient did have an upper endoscopy in 2009 which showed an irregular Z-line, biopsies of this area were negative for Rodriguez's epithelium.  The patient's last colonoscopy was in 2017, with a suboptimal prep, and she feels that she is due for colonoscopy.

## 2022-02-26 PROBLEM — 305058001: Status: ACTIVE | Noted: 2022-02-26

## 2022-03-18 ENCOUNTER — OFFICE VISIT (OUTPATIENT)
Dept: URBAN - METROPOLITAN AREA SURGERY CENTER 25 | Facility: SURGERY CENTER | Age: 62
End: 2022-03-18
Payer: COMMERCIAL

## 2022-03-18 DIAGNOSIS — D12.2 ADENOMA OF ASCENDING COLON: ICD-10-CM

## 2022-03-18 DIAGNOSIS — Z12.11 COLON CANCER SCREENING: ICD-10-CM

## 2022-03-18 PROCEDURE — 45385 COLONOSCOPY W/LESION REMOVAL: CPT | Performed by: INTERNAL MEDICINE

## 2022-03-18 PROCEDURE — G8907 PT DOC NO EVENTS ON DISCHARG: HCPCS | Performed by: INTERNAL MEDICINE

## 2022-03-18 RX ORDER — FAMOTIDINE 20 MG/1
1 TABLET AT BEDTIME AS NEEDED TABLET, FILM COATED ORAL ONCE A DAY
Status: ACTIVE | COMMUNITY

## 2022-03-18 RX ORDER — NAPROXEN SODIUM 220 MG
TAKE 1 TABLET TWICE DAILY AS NEEDED TABLET ORAL
Refills: 0 | Status: ACTIVE | COMMUNITY

## 2022-03-18 RX ORDER — POLYETHYLENE GLYCOL 3350, SODIUM SULFATE, SODIUM CHLORIDE, POTASSIUM CHLORIDE, ASCORBIC ACID, SODIUM ASCORBATE 140-9-5.2G
AS DIRECTED KIT ORAL ONCE
Qty: 30 | Refills: 1 | Status: ACTIVE | COMMUNITY
Start: 2022-02-04 | End: 2022-04-05

## 2022-03-18 RX ORDER — ALENDRONATE SODIUM 70 MG/1
1 TABLET 30 MINUTES BEFORE THE FIRST FOOD, BEVERAGE OR MEDICINE OF THE DAY WITH PLAIN WATER TABLET ORAL WEEKLY
Status: ACTIVE | COMMUNITY

## 2022-03-18 RX ORDER — CYCLOSPORINE 0.5 MG/ML
1 DROP INTO AFFECTED EYE EMULSION OPHTHALMIC TWICE A DAY
Status: ACTIVE | COMMUNITY

## 2022-03-18 RX ORDER — BUPROPION HYDROCHLORIDE 75 MG/1
1 TABLET TABLET ORAL TWICE A DAY
Status: ACTIVE | COMMUNITY

## 2022-03-18 RX ORDER — HYOSCYAMINE SULFATE 0.12 MG/1
DISSOLVE 1 TABLET UNDER THE TONGUE EVERY 4 TO 6 HOURS AS NEEDED FOR ABDOMINAL PAIN TABLET, ORALLY DISINTEGRATING ORAL
Qty: 30 | Refills: 3 | Status: ACTIVE | COMMUNITY
Start: 2017-03-29

## 2022-03-18 RX ORDER — CALCIUM CARBONATE/VITAMIN D3 600 MG-10
1 TABLET WITH A MEAL TABLET ORAL ONCE A DAY
Status: ACTIVE | COMMUNITY

## 2022-04-27 ENCOUNTER — OFFICE VISIT (OUTPATIENT)
Dept: URBAN - METROPOLITAN AREA CLINIC 113 | Facility: CLINIC | Age: 62
End: 2022-04-27

## 2022-05-04 ENCOUNTER — OFFICE VISIT (OUTPATIENT)
Dept: URBAN - METROPOLITAN AREA CLINIC 113 | Facility: CLINIC | Age: 62
End: 2022-05-04
Payer: COMMERCIAL

## 2022-05-04 ENCOUNTER — DASHBOARD ENCOUNTERS (OUTPATIENT)
Age: 62
End: 2022-05-04

## 2022-05-04 VITALS
HEART RATE: 69 BPM | TEMPERATURE: 98.2 F | BODY MASS INDEX: 22.58 KG/M2 | SYSTOLIC BLOOD PRESSURE: 104 MMHG | WEIGHT: 149 LBS | DIASTOLIC BLOOD PRESSURE: 69 MMHG | HEIGHT: 68 IN

## 2022-05-04 DIAGNOSIS — Z86.010 HISTORY OF ADENOMATOUS POLYP OF COLON: ICD-10-CM

## 2022-05-04 DIAGNOSIS — K21.9 GERD WITHOUT ESOPHAGITIS: ICD-10-CM

## 2022-05-04 DIAGNOSIS — K57.30 COLON, DIVERTICULOSIS: ICD-10-CM

## 2022-05-04 DIAGNOSIS — K64.8 INTERNAL HEMORRHOID: ICD-10-CM

## 2022-05-04 PROBLEM — 429047008: Status: ACTIVE | Noted: 2022-05-04

## 2022-05-04 PROBLEM — 90458007: Status: ACTIVE | Noted: 2022-05-04

## 2022-05-04 PROBLEM — 266435005: Status: ACTIVE | Noted: 2022-02-04

## 2022-05-04 PROBLEM — 733657002: Status: ACTIVE | Noted: 2022-05-04

## 2022-05-04 PROCEDURE — 99213 OFFICE O/P EST LOW 20 MIN: CPT | Performed by: NURSE PRACTITIONER

## 2022-05-04 RX ORDER — ALENDRONATE SODIUM 70 MG/1
1 TABLET 30 MINUTES BEFORE THE FIRST FOOD, BEVERAGE OR MEDICINE OF THE DAY WITH PLAIN WATER TABLET ORAL WEEKLY
Status: ACTIVE | COMMUNITY

## 2022-05-04 RX ORDER — CALCIUM CARBONATE/VITAMIN D3 600 MG-10
1 TABLET WITH A MEAL TABLET ORAL ONCE A DAY
Status: ACTIVE | COMMUNITY

## 2022-05-04 RX ORDER — NAPROXEN SODIUM 220 MG
TAKE 1 TABLET TWICE DAILY AS NEEDED TABLET ORAL
Refills: 0 | Status: ACTIVE | COMMUNITY

## 2022-05-04 RX ORDER — HYOSCYAMINE SULFATE 0.12 MG/1
DISSOLVE 1 TABLET UNDER THE TONGUE EVERY 4 TO 6 HOURS AS NEEDED FOR ABDOMINAL PAIN TABLET, ORALLY DISINTEGRATING ORAL
Qty: 30 | Refills: 3 | Status: ON HOLD | COMMUNITY
Start: 2017-03-29

## 2022-05-04 RX ORDER — BUPROPION HYDROCHLORIDE 75 MG/1
1 TABLET TABLET ORAL TWICE A DAY
Status: ACTIVE | COMMUNITY

## 2022-05-04 RX ORDER — FAMOTIDINE 20 MG/1
1 TABLET AT BEDTIME AS NEEDED TABLET, FILM COATED ORAL ONCE A DAY
Status: ACTIVE | COMMUNITY

## 2022-05-04 RX ORDER — CYCLOSPORINE 0.5 MG/ML
1 DROP INTO AFFECTED EYE EMULSION OPHTHALMIC TWICE A DAY
Status: ACTIVE | COMMUNITY

## 2022-05-04 NOTE — HPI-OTHER HISTORIES
Colonoscopy 3/18/2022:BB PS 8, removal of a 10 mm proximal ascending sessile polyp, sigmoid and descending diverticulosis, mild grade 1 nonbleeding internal hemorrhoids.  Pathology reported the polyp as a sessile serrated adenoma.  Surveillance recommended in 2025. Barium swallow 2/16/2022:Unremarkable air-contrast barium swallow.

## 2022-05-04 NOTE — HPI-TODAY'S VISIT:
This is a 62-year-old female with a history of GERD, abdominal pain, pelvic floor dyssynergia, and constipation presenting for follow-up. She was last seen 2/4/2022.  She reported worsening acid reflux.  She was taking Pepcid 40 mg in the evening and had been off Nexium.  She had nocturnal symptoms.  She was instructed to increase Pepcid to 40 mg twice a day.  She was to continue lifestyle modification.  She was reluctant to restart PPI due to concerns regarding osteoporosis/osteopenia for which she was taking Fosamax.  Prolia was being considered.  She reported a gurgling sensation associated with swallowing.  She was scheduled for a barium swallow.  She was due screening for colon cancer and was scheduled for a colonoscopy.  She is taking Pepcid 20 mg daily in the evening and observing lifestyle modification for GERD.  She admits occasional daytime regurgitation without heartburn if she bends at the waist.  She has occasional nocturnal symptoms.  She denies dysphagia, abdominal pain, or any other abdominal symptoms.  She reports that "I really feel okay."

## 2025-03-17 ENCOUNTER — LAB OUTSIDE AN ENCOUNTER (OUTPATIENT)
Dept: URBAN - METROPOLITAN AREA SURGERY CENTER 25 | Facility: SURGERY CENTER | Age: 65
End: 2025-03-17

## 2025-03-20 ENCOUNTER — CLAIMS CREATED FROM THE CLAIM WINDOW (OUTPATIENT)
Dept: URBAN - METROPOLITAN AREA SURGERY CENTER 25 | Facility: SURGERY CENTER | Age: 65
End: 2025-03-20
Payer: MEDICARE

## 2025-03-20 ENCOUNTER — CLAIMS CREATED FROM THE CLAIM WINDOW (OUTPATIENT)
Dept: URBAN - METROPOLITAN AREA CLINIC 4 | Facility: CLINIC | Age: 65
End: 2025-03-20
Payer: MEDICARE

## 2025-03-20 DIAGNOSIS — K63.5 BENIGN COLON POLYP: ICD-10-CM

## 2025-03-20 DIAGNOSIS — Z86.0101 H/O ADENOMATOUS POLYP OF COLON: ICD-10-CM

## 2025-03-20 DIAGNOSIS — D12.2 ADENOMA OF ASCENDING COLON: ICD-10-CM

## 2025-03-20 DIAGNOSIS — Z86.0100 PERSONAL HISTORY OF COLON POLYPS, UNSPECIFIED: ICD-10-CM

## 2025-03-20 DIAGNOSIS — Z09 ENCOUNTER FOR FOLLOW-UP EXAMINATION AFTER COMPLETED TREATMENT FOR CONDITIONS OTHER THAN MALIGNANT NEOPLASM: ICD-10-CM

## 2025-03-20 DIAGNOSIS — Z86.0100 PERSONAL HISTORY OF COLONIC POLYPS: ICD-10-CM

## 2025-03-20 DIAGNOSIS — D12.2 BENIGN NEOPLASM OF ASCENDING COLON: ICD-10-CM

## 2025-03-20 DIAGNOSIS — K57.30 DIVERTICULOSIS OF COLON: ICD-10-CM

## 2025-03-20 PROCEDURE — 45385 COLONOSCOPY W/LESION REMOVAL: CPT | Performed by: CLINIC/CENTER

## 2025-03-20 PROCEDURE — 00811 ANES LWR INTST NDSC NOS: CPT | Performed by: ANESTHESIOLOGY

## 2025-03-20 PROCEDURE — 0529F INTRVL 3/>YR PTS CLNSCP DOCD: CPT | Performed by: CLINIC/CENTER

## 2025-03-20 PROCEDURE — 88305 TISSUE EXAM BY PATHOLOGIST: CPT | Performed by: PATHOLOGY

## 2025-03-20 PROCEDURE — 00811 ANES LWR INTST NDSC NOS: CPT | Performed by: NURSE ANESTHETIST, CERTIFIED REGISTERED

## 2025-03-20 PROCEDURE — 45385 COLONOSCOPY W/LESION REMOVAL: CPT | Performed by: INTERNAL MEDICINE

## 2025-03-20 PROCEDURE — 0529F INTRVL 3/>YR PTS CLNSCP DOCD: CPT | Performed by: INTERNAL MEDICINE

## 2025-03-20 RX ORDER — NAPROXEN SODIUM 220 MG
TAKE 1 TABLET TWICE DAILY AS NEEDED TABLET ORAL
Refills: 0 | Status: ACTIVE | COMMUNITY

## 2025-03-20 RX ORDER — HYOSCYAMINE SULFATE 0.12 MG/1
DISSOLVE 1 TABLET UNDER THE TONGUE EVERY 4 TO 6 HOURS AS NEEDED FOR ABDOMINAL PAIN TABLET ORAL
Qty: 30 | Refills: 3 | Status: ON HOLD | COMMUNITY
Start: 2017-03-29

## 2025-03-20 RX ORDER — BUPROPION HYDROCHLORIDE 75 MG/1
1 TABLET TABLET ORAL TWICE A DAY
Status: ACTIVE | COMMUNITY

## 2025-03-20 RX ORDER — CALCIUM CARBONATE/VITAMIN D3 600 MG-10
1 TABLET WITH A MEAL TABLET ORAL ONCE A DAY
Status: ACTIVE | COMMUNITY

## 2025-03-20 RX ORDER — ALENDRONATE SODIUM 70 MG/1
1 TABLET 30 MINUTES BEFORE THE FIRST FOOD, BEVERAGE OR MEDICINE OF THE DAY WITH PLAIN WATER TABLET ORAL WEEKLY
Status: ACTIVE | COMMUNITY

## 2025-03-20 RX ORDER — CYCLOSPORINE 0.5 MG/ML
1 DROP INTO AFFECTED EYE EMULSION OPHTHALMIC TWICE A DAY
Status: ACTIVE | COMMUNITY

## 2025-03-20 RX ORDER — FAMOTIDINE 20 MG/1
1 TABLET AT BEDTIME AS NEEDED TABLET, FILM COATED ORAL ONCE A DAY
Status: ACTIVE | COMMUNITY

## 2025-04-22 ENCOUNTER — OFFICE VISIT (OUTPATIENT)
Dept: URBAN - METROPOLITAN AREA CLINIC 113 | Facility: CLINIC | Age: 65
End: 2025-04-22
Payer: MEDICARE

## 2025-04-22 DIAGNOSIS — K57.30 COLON, DIVERTICULOSIS: ICD-10-CM

## 2025-04-22 DIAGNOSIS — K64.8 INTERNAL HEMORRHOID: ICD-10-CM

## 2025-04-22 DIAGNOSIS — Z86.0101 HISTORY OF ADENOMATOUS POLYP OF COLON: ICD-10-CM

## 2025-04-22 DIAGNOSIS — R10.11 RUQ ABDOMINAL PAIN: ICD-10-CM

## 2025-04-22 DIAGNOSIS — K21.9 GERD WITHOUT ESOPHAGITIS: ICD-10-CM

## 2025-04-22 PROCEDURE — 99214 OFFICE O/P EST MOD 30 MIN: CPT | Performed by: NURSE PRACTITIONER

## 2025-04-22 RX ORDER — BUPROPION HYDROCHLORIDE 75 MG/1
1 TABLET TABLET ORAL TWICE A DAY
Status: ON HOLD | COMMUNITY

## 2025-04-22 RX ORDER — HYOSCYAMINE SULFATE 0.12 MG/1
DISSOLVE 1 TABLET UNDER THE TONGUE EVERY 4 TO 6 HOURS AS NEEDED FOR ABDOMINAL PAIN TABLET ORAL
Qty: 30 | Refills: 3 | Status: ON HOLD | COMMUNITY
Start: 2017-03-29

## 2025-04-22 RX ORDER — HYOSCYAMINE SULFATE 0.12 MG/1
DISSOLVE 1 TABLET UNDER THE TONGUE EVERY 4 TO 6 HOURS AS NEEDED FOR ABDOMINAL PAIN TABLET ORAL
Qty: 30 | Refills: 3
Start: 2017-03-29

## 2025-04-22 RX ORDER — CALCIUM CARBONATE/VITAMIN D3 600 MG-10
1 TABLET WITH A MEAL TABLET ORAL ONCE A DAY
Status: ON HOLD | COMMUNITY

## 2025-04-22 RX ORDER — FAMOTIDINE 20 MG/1
1 TABLET AT BEDTIME AS NEEDED TABLET, FILM COATED ORAL ONCE A DAY
Status: ACTIVE | COMMUNITY

## 2025-04-22 RX ORDER — ALENDRONATE SODIUM 70 MG/1
1 TABLET 30 MINUTES BEFORE THE FIRST FOOD, BEVERAGE OR MEDICINE OF THE DAY WITH PLAIN WATER TABLET ORAL WEEKLY
Status: ON HOLD | COMMUNITY

## 2025-04-22 RX ORDER — CYCLOSPORINE 0.5 MG/ML
1 DROP INTO AFFECTED EYE EMULSION OPHTHALMIC TWICE A DAY
Status: ACTIVE | COMMUNITY

## 2025-04-22 RX ORDER — FAMOTIDINE 20 MG/1
TAKE 1 TABLET TABLET, FILM COATED ORAL ONCE A DAY
Qty: 90 | Refills: 3 | OUTPATIENT
Start: 2025-04-22

## 2025-04-22 RX ORDER — CHOLECALCIFEROL (VITAMIN D3) 25 MCG
1 CAPSULE CAPSULE ORAL ONCE A DAY
Status: ACTIVE | COMMUNITY

## 2025-04-22 RX ORDER — NAPROXEN SODIUM 220 MG
TAKE 1 TABLET TWICE DAILY AS NEEDED TABLET ORAL
Refills: 0 | Status: ACTIVE | COMMUNITY

## 2025-04-22 NOTE — HPI-OTHER HISTORIES
Colonoscopy 3/20/2025:BBPS 9, sigmoid and descending diverticulosis, removal of 2 sessile 4 to 8 mm ascending polyps, exam otherwise normal to the terminal ileum. Pathology: Polyps were sessile serrated adenomas. Surveillance recommended in 2028.

## 2025-04-22 NOTE — HPI-TODAY'S VISIT:
This is a 65-year-old female with a history of GERD, abdominal pain, pelvic floor dyssynergia, constipation, internal hemorrhoids, and adenomatous colon polyps due surveillance in 2028 presenting for follow-up after surveillance colonoscopy. She was last seen in the office 5/4/2022.  GERD was controlled with Pepcid 20 mg daily and lifestyle modification.  She had undergone a prior colonoscopy during which a 10 mm ascending sessile serrated adenoma was removed and surveillance was recommended in 2025.  She was to start Benefiber 2 tablespoons daily for a finding of diverticulosis.  Colonoscopy 3/20/2025:BBPS 9, sigmoid and descending diverticulosis, removal of 2 sessile 4 to 8 mm ascending polyps, exam otherwise normal to the terminal ileum.  Pathology: Polyps were sessile serrated adenomas.  Surveillance recommended in 2028.  She is doing well.  She is taking famotidine 20 mg at bedtime and sleeps with her head of bed elevated when she anticipates nocturnal reflux.  Symptoms are controlled.  She has occasional excessive gas that she attributes to her diet.  This is relieved with Gas-X.  She has experienced constipation and bloating if she is taking Benefiber regularly.  She is observing a high-fiber diet most days.  She has occasional diet dependent pain indicating the right upper quadrant attributed to sphincter of Oddi dysfunction.  This occurs once a week or less.  She is requesting a refill on hyoscyamine which is effective when needed.

## 2025-04-26 PROBLEM — 429047008: Status: ACTIVE | Noted: 2025-04-26

## 2025-04-26 PROBLEM — 301717006: Status: ACTIVE | Noted: 2025-04-26
